# Patient Record
Sex: FEMALE | Race: WHITE
[De-identification: names, ages, dates, MRNs, and addresses within clinical notes are randomized per-mention and may not be internally consistent; named-entity substitution may affect disease eponyms.]

---

## 2019-10-25 ENCOUNTER — HOSPITAL ENCOUNTER (OUTPATIENT)
Dept: HOSPITAL 11 - JP.SDS | Age: 44
LOS: 1 days | Discharge: HOME | End: 2019-10-26
Attending: SURGERY
Payer: OTHER GOVERNMENT

## 2019-10-25 DIAGNOSIS — K80.10: Primary | ICD-10-CM

## 2019-10-25 DIAGNOSIS — Z87.42: ICD-10-CM

## 2019-10-25 DIAGNOSIS — Z79.899: ICD-10-CM

## 2019-10-25 DIAGNOSIS — K42.9: ICD-10-CM

## 2019-10-25 DIAGNOSIS — K82.8: ICD-10-CM

## 2019-10-25 DIAGNOSIS — Z98.84: ICD-10-CM

## 2019-10-25 DIAGNOSIS — G43.909: ICD-10-CM

## 2019-10-25 PROCEDURE — C9113 INJ PANTOPRAZOLE SODIUM, VIA: HCPCS

## 2019-10-25 RX ADMIN — HYDROCODONE BITARTRATE AND ACETAMINOPHEN PRN TAB: 5; 325 TABLET ORAL at 21:45

## 2019-10-25 RX ADMIN — HYDROCODONE BITARTRATE AND ACETAMINOPHEN PRN TAB: 5; 325 TABLET ORAL at 15:38

## 2019-10-26 RX ADMIN — HYDROCODONE BITARTRATE AND ACETAMINOPHEN PRN TAB: 5; 325 TABLET ORAL at 10:14

## 2019-10-28 NOTE — DISCH
FINAL DIAGNOSES:

1. Biliary dyskinesia associated with cholelithiasis.

2. Umbilical hernia.

3. Bariatric surgery status.

4. History of polycystic ovary syndrome.

5. History of migraines.

 

OPERATIVE PROCEDURES:  Done on 10/25/2019, diagnostic laparoscopy with,

1. Cholecystectomy.

2. Umbilical hernia repair.

 

HOSPITAL COURSE:  This is a 44-year-old female presenting with ongoing attacks of right

upper quadrant pain.  A CCK stimulated HIDA scan was obtained, which showed only very

minimal filling of the gallbladder making it distinctly abnormal.  The patient was therefore

set up for a cholecystectomy, which was done on the date of the admission.  Within the

gallbladder, upon its removal, she was noted to have a very fine, sand-like collection of

stones.  The patient also had a small umbilical hernia, which was repaired concurrently.

Postoperatively, no major problems were noted.  Postoperative labs looked good this morning

and LIZ drainage has been clear and will be able to be removed.  She will be discharged home

to continue home medications plus Norco 5/325 one tablet q.4 hours p.r.n. pain, #25.  She

will be following up with Minerva Rodriguez at [facility] in [place], on

Tuesday, 11/05/2019, at 11 a.m.

## 2019-11-08 NOTE — OR
DATE OF PROCEDURE:  10/25/2019

 

SURGEON:  Min Nicole MD

 

PREOPERATIVE DIAGNOSIS:  Chronic cholecystitis associated with minimal filling of

gallbladder on HIDA scan.

 

POSTOPERATIVE DIAGNOSES:

1. Chronic cholecystitis associated with minimal filling of gallbladder on HIDA scan.

2. Umbilical hernia.

 

OPERATIVE PROCEDURES:  Diagnostic laparoscopy with:

1. Cholecystectomy (89426).

2. Umbilical hernia repair (79270).

 

ANESTHESIA:  General.

 

ASSISTANT:  Minerva Rodriguez PA-C.

 

INDICATIONS FOR PROCEDURE:  This is a 44-year-old presenting with recurrent symptoms that

are suggestive of biliary colic.  A HIDA scan was obtained, which showed very minimal

filling of the gallbladder indicating dysfunctional gallbladder.  Plan is to treat her

laparoscopically and, if necessary, open cholecystectomy.  Potential risks of the procedure

including bleeding, infection, and injury to underlying viscera, problems with stones

migrating in the common bile duct requiring additional procedure for correction, as well as

possibility of some persistent symptoms postoperatively were all reviewed, and the patient

wishes to proceed.

 

DETAILS OF PROCEDURE:  The patient was taken to the operating room and placed in a supine

position.  After general endotracheal anesthesia was induced, the abdomen was prepped and

draped.  A transverse epigastric incision was initially made and carried down through the

skin and subcutaneous tissue and the peritoneal cavity entered under direct vision with an

Optiview trocar.  Peritoneal cavity was inflated to 15 mmHg pressure with CO2.  Laparoscope

was reinserted.  No underlying trocar insertion site injuries were seen.  Transverse

subumbilical incision was made.  The patient was noted to have a small umbilical hernia

containing some preperitoneal fat within it.  The trocar was brought directly through the

hernia with estimated fascial defect of around 8 mm.  Additional 5 mm trocars were then

placed in the right subcostal area and bilateral transversus abdominis plane blocks were

then placed.

 

The abdomen was inspected.  The patient was noted to have a thick-walled, somewhat gray-

appearing gallbladder consistent with chronic cholecystitis with some omental adhesions,

which were initially taken down with Harmonic scalpel.  The gallbladder was then retracted

anteriorly and laterally and dissection began on the gallbladder neck, continued around the

gallbladder neck/cystic duct junction.  The cystic duct/gallbladder junction was noted to be

fairly friable and thick-walled, and it was felt there was significant risk of clips not

holding well.  Given this, this was encircled with Endo Stitch and divided distally with

Harmonic scalpel.  The artery was then taken with Harmonic scalpel as well.  The gallbladder

was then dissected off the gallbladder bed using the Harmonic scalpel as well and delivered

through the epigastric trocar site containing some very tiny stones within it.

 

The area of dissection was inspected.  No bile leaks or problems with bleeding were noted.

A drain was felt not to be necessary.  At this point, the camera was brought back up to the

epigastric site, and using the laparoscopic suture passer, several sutures of 0 Vicryl

stitch were placed, orienting the closure of the umbilical hernia in a transverse

orientation.  Once these were in place, the remaining trocars were removed and peritoneal

cavity deflated.  The fascia at the epigastric site was closed with 0 Vicryl stitch as well,

and after tiny umbilical hernia sutures, the skin incision sites were closed with 4-0 Vicryl

and skin stitch and dressing applied.  The patient was taken to the recovery room in

satisfactory condition.

 

Physician assistant, Minerva Rodriguez, played an essential role in assisting in this case,

helping to position the patient, retract structures as needed, as well as suturing and

cutting sutures when indicated.  Her presence improved patient safety and decreased

operative time.

 

 

 

 

Min Nicole MD

DD:  11/06/2019 11:13:44

DT:  11/08/2019 08:46:54

Job #:  [id number]

## 2020-11-24 ENCOUNTER — HOSPITAL ENCOUNTER (OUTPATIENT)
Dept: HOSPITAL 11 - JP.SDS | Age: 45
Discharge: HOME | End: 2020-11-24
Attending: SURGERY
Payer: COMMERCIAL

## 2020-11-24 DIAGNOSIS — Z98.84: ICD-10-CM

## 2020-11-24 DIAGNOSIS — K90.9: ICD-10-CM

## 2020-11-24 DIAGNOSIS — F32.9: ICD-10-CM

## 2020-11-24 DIAGNOSIS — K28.9: Primary | ICD-10-CM

## 2020-11-24 DIAGNOSIS — E66.9: ICD-10-CM

## 2020-11-24 PROCEDURE — 80053 COMPREHEN METABOLIC PANEL: CPT

## 2020-11-24 PROCEDURE — 36415 COLL VENOUS BLD VENIPUNCTURE: CPT

## 2020-11-24 PROCEDURE — 82150 ASSAY OF AMYLASE: CPT

## 2020-11-24 PROCEDURE — 43239 EGD BIOPSY SINGLE/MULTIPLE: CPT

## 2020-11-24 PROCEDURE — 87081 CULTURE SCREEN ONLY: CPT

## 2020-11-24 PROCEDURE — 83690 ASSAY OF LIPASE: CPT

## 2020-11-24 RX ADMIN — GLYCOPYRROLATE ONE MG: 0.2 INJECTION, SOLUTION INTRAMUSCULAR; INTRAVENOUS at 10:32

## 2020-11-24 RX ADMIN — GLYCOPYRROLATE ONE MG: 0.2 INJECTION, SOLUTION INTRAMUSCULAR; INTRAVENOUS at 11:52

## 2020-12-02 NOTE — OR
DATE OF PROCEDURE:  11/24/2020

 

SURGEON:  Min Nicole MD

 

PREOPERATIVE DIAGNOSIS:  Epigastric pain, status post Medhat-en-Y gastric bypass.

 

POSTOPERATIVE DIAGNOSIS:  Marginal ulcer at gastrojejunostomy.

 

PROCEDURE:  Upper GI endoscopy with biopsies of gastric pouch for CLOtest.

 

ANESTHESIA:  IV sedation.

 

INDICATIONS FOR PROCEDURE:  A 45-year-old status post Medhat-en-Y gastric bypass in 2018.

Recently, she did have some epigastric discomfort.  She has been on Protonix now for 3 days

and does feel that things are getting somewhat better.  Plan is to proceed with upper

endoscopy with biopsies as indicated.  Potential risks including bleeding and perforation

were discussed, and the patient wishes to proceed.

 

DETAILS OF PROCEDURE:  The patient was taken to the operating room, placed in a left lateral

decubitus position.  IV sedation was administered, after which the upper GI endoscope was

passed orally through the length of esophagus into the gastric pouch, from there through the

gastrojejunostomy roughly 20 cm into the Medhat limb.

 

The only pathology is that of a marginal ulcer measuring about 1 cm located in the jejunum

medially flushed with the gastrojejunostomy.  This was covered with fibrinous exudate.  They

appeared to be probably healing.  Biopsies were obtained from the gastric pouch and sent for

CLOtest for H pylori and the procedure was then concluded.

 

Plan will be to continue with Protonix probably for 3 months cycle.  Should be following up

with Minerva Rodriguez as arranged in January and is instructed to call in the interim if she has

additional problems.

 

 

 

 

Min Nicole MD

DD:  12/02/2020 10:17:44

DT:  12/02/2020 14:32:42

Job #:  [id number]